# Patient Record
Sex: FEMALE | Race: WHITE | NOT HISPANIC OR LATINO
[De-identification: names, ages, dates, MRNs, and addresses within clinical notes are randomized per-mention and may not be internally consistent; named-entity substitution may affect disease eponyms.]

---

## 2021-07-09 ENCOUNTER — RX ONLY (RX ONLY)
Age: 71
End: 2021-07-09

## 2021-07-09 RX ORDER — CLOBETASOL PROPIONATE 0.5 MG/G
1 AS DIRECTED OINTMENT TOPICAL TWICE A DAY
Qty: 45 | Refills: 2
Start: 2021-07-09

## 2022-02-23 ENCOUNTER — *OTHER (OUTPATIENT)
Dept: URBAN - METROPOLITAN AREA CLINIC 24 | Facility: CLINIC | Age: 72
Setting detail: DERMATOLOGY
End: 2022-02-23

## 2022-02-23 ENCOUNTER — RX ONLY (RX ONLY)
Age: 72
End: 2022-02-23

## 2022-02-23 PROCEDURE — 99213 OFFICE O/P EST LOW 20 MIN: CPT

## 2022-02-23 RX ORDER — BETAMETHASONE DIPROPIONATE 0.5 MG/G
1 LIBERALLY OINTMENT TOPICAL TWICE A DAY
Qty: 45 | Refills: 4
Start: 2022-02-23

## 2022-02-23 RX ORDER — OXICONAZOLE NITRATE 10 MG/G
1 LIBERALLY CREAM TOPICAL TWICE A DAY
Qty: 90 | Refills: 4
Start: 2022-02-23

## 2022-02-25 ENCOUNTER — RX ONLY (RX ONLY)
Age: 72
End: 2022-02-25

## 2022-02-25 RX ORDER — HALOBETASOL PROPIONATE 0.5 MG/G
1 AS DIRECTED OINTMENT TOPICAL TWICE A DAY
Qty: 50 | Refills: 4
Start: 2022-02-25

## 2022-03-02 ENCOUNTER — RX ONLY (RX ONLY)
Age: 72
End: 2022-03-02

## 2022-03-02 RX ORDER — HALOBETASOL PROPIONATE 0.5 MG/G
1 AS DIRECTED OINTMENT TOPICAL TWICE A DAY
Qty: 50 | Refills: 4
Start: 2022-03-02

## 2022-11-02 ENCOUNTER — APPOINTMENT (OUTPATIENT)
Dept: URBAN - METROPOLITAN AREA CLINIC 193 | Age: 72
Setting detail: DERMATOLOGY
End: 2022-11-03

## 2022-11-02 DIAGNOSIS — L82.1 OTHER SEBORRHEIC KERATOSIS: ICD-10-CM

## 2022-11-02 DIAGNOSIS — L81.4 OTHER MELANIN HYPERPIGMENTATION: ICD-10-CM

## 2022-11-02 DIAGNOSIS — D22 MELANOCYTIC NEVI: ICD-10-CM

## 2022-11-02 DIAGNOSIS — B35.1 TINEA UNGUIUM: ICD-10-CM

## 2022-11-02 DIAGNOSIS — B35.3 TINEA PEDIS: ICD-10-CM

## 2022-11-02 DIAGNOSIS — L57.8 OTHER SKIN CHANGES DUE TO CHRONIC EXPOSURE TO NONIONIZING RADIATION: ICD-10-CM

## 2022-11-02 PROBLEM — D22.61 MELANOCYTIC NEVI OF RIGHT UPPER LIMB, INCLUDING SHOULDER: Status: ACTIVE | Noted: 2022-11-02

## 2022-11-02 PROCEDURE — OTHER COUNSELING: OTHER

## 2022-11-02 PROCEDURE — OTHER PRESCRIPTION: OTHER

## 2022-11-02 PROCEDURE — 99213 OFFICE O/P EST LOW 20 MIN: CPT

## 2022-11-02 PROCEDURE — OTHER PRESCRIPTION MEDICATION MANAGEMENT: OTHER

## 2022-11-02 RX ORDER — SULCONAZOLE NITRATE 10 MG/G
CREAM TOPICAL
Qty: 60 | Refills: 3 | Status: ERX | COMMUNITY
Start: 2022-11-02

## 2022-11-02 ASSESSMENT — LOCATION SIMPLE DESCRIPTION DERM
LOCATION SIMPLE: CHEST
LOCATION SIMPLE: LEFT GREAT TOE
LOCATION SIMPLE: LEFT PLANTAR SURFACE
LOCATION SIMPLE: RIGHT UPPER ARM
LOCATION SIMPLE: LEFT PRETIBIAL REGION
LOCATION SIMPLE: LEFT ANTERIOR NECK
LOCATION SIMPLE: RIGHT PLANTAR SURFACE
LOCATION SIMPLE: RIGHT GREAT TOE

## 2022-11-02 ASSESSMENT — LOCATION DETAILED DESCRIPTION DERM
LOCATION DETAILED: LEFT GREAT TOENAIL
LOCATION DETAILED: RIGHT GREAT TOENAIL
LOCATION DETAILED: RIGHT MEDIAL PLANTAR MIDFOOT
LOCATION DETAILED: LEFT INFERIOR ANTERIOR NECK
LOCATION DETAILED: RIGHT ANTERIOR PROXIMAL UPPER ARM
LOCATION DETAILED: LEFT MEDIAL SUPERIOR CHEST
LOCATION DETAILED: LEFT PROXIMAL PRETIBIAL REGION
LOCATION DETAILED: LEFT MEDIAL PLANTAR MIDFOOT

## 2022-11-02 ASSESSMENT — LOCATION ZONE DERM
LOCATION ZONE: FEET
LOCATION ZONE: LEG
LOCATION ZONE: TRUNK
LOCATION ZONE: NECK
LOCATION ZONE: ARM
LOCATION ZONE: TOENAIL

## 2022-11-02 NOTE — HPI: EVALUATION OF SKIN LESION(S)
Hpi Title: Evaluation of Skin Lesions
Additional History: Patient also reports a lesion on her left ear. She also reports itching to left foot.

## 2022-11-02 NOTE — PROCEDURE: PRESCRIPTION MEDICATION MANAGEMENT
Detail Level: Zone
Plan: Exelderm cream sent to [facility]. To be applied twice a day to feet.
Render In Strict Bullet Format?: No
Continue Regimen: C/W Oxistat
Plan: referred patient to see a podiatrist for further treatment options.

## 2022-11-07 ENCOUNTER — RX ONLY (RX ONLY)
Age: 72
End: 2022-11-07

## 2022-11-07 RX ORDER — CICLOPIROX OLAMINE 7.7 MG/G
CREAM TOPICAL
Qty: 90 | Refills: 3 | Status: ERX | COMMUNITY
Start: 2022-11-07

## 2023-01-05 ENCOUNTER — RX ONLY (RX ONLY)
Age: 73
End: 2023-01-05

## 2023-01-05 RX ORDER — CICLOPIROX OLAMINE 7.7 MG/G
0.77 CREAM TOPICAL BID
Qty: 90 | Refills: 3 | Status: ERX | COMMUNITY
Start: 2023-01-05

## 2024-04-16 ENCOUNTER — APPOINTMENT (OUTPATIENT)
Dept: URBAN - METROPOLITAN AREA CLINIC 193 | Age: 74
Setting detail: DERMATOLOGY
End: 2024-04-16

## 2024-04-16 DIAGNOSIS — B35.3 TINEA PEDIS: ICD-10-CM

## 2024-04-16 DIAGNOSIS — B35.1 TINEA UNGUIUM: ICD-10-CM

## 2024-04-16 PROCEDURE — OTHER PRESCRIPTION MEDICATION MANAGEMENT: OTHER

## 2024-04-16 PROCEDURE — 99213 OFFICE O/P EST LOW 20 MIN: CPT

## 2024-04-16 PROCEDURE — OTHER PRESCRIPTION: OTHER

## 2024-04-16 PROCEDURE — OTHER COUNSELING: OTHER

## 2024-04-16 RX ORDER — CICLOPIROX OLAMINE 7.7 MG/G
CREAM TOPICAL BID
Qty: 90 | Refills: 5 | Status: ERX

## 2024-04-16 RX ORDER — CICLOPIROX 80 MG/ML
SOLUTION TOPICAL QHS
Qty: 6.6 | Refills: 4 | Status: ERX | COMMUNITY
Start: 2024-04-16

## 2024-04-16 ASSESSMENT — LOCATION SIMPLE DESCRIPTION DERM
LOCATION SIMPLE: LEFT 3RD TOE
LOCATION SIMPLE: LEFT 5TH TOE
LOCATION SIMPLE: RIGHT PLANTAR SURFACE
LOCATION SIMPLE: LEFT 4TH TOE
LOCATION SIMPLE: LEFT 2ND TOE
LOCATION SIMPLE: RIGHT GREAT TOE
LOCATION SIMPLE: LEFT GREAT TOE
LOCATION SIMPLE: LEFT PLANTAR SURFACE

## 2024-04-16 ASSESSMENT — LOCATION DETAILED DESCRIPTION DERM
LOCATION DETAILED: RIGHT MEDIAL PLANTAR MIDFOOT
LOCATION DETAILED: LEFT MEDIAL PLANTAR MIDFOOT
LOCATION DETAILED: LEFT 5TH TOENAIL
LOCATION DETAILED: LEFT 3RD TOENAIL
LOCATION DETAILED: LEFT 4TH TOENAIL
LOCATION DETAILED: LEFT GREAT TOENAIL
LOCATION DETAILED: RIGHT GREAT TOENAIL
LOCATION DETAILED: LEFT 2ND TOENAIL

## 2024-04-16 ASSESSMENT — LOCATION ZONE DERM
LOCATION ZONE: FEET
LOCATION ZONE: TOENAIL

## 2024-04-16 NOTE — PROCEDURE: PRESCRIPTION MEDICATION MANAGEMENT
Render In Strict Bullet Format?: No
Initiate Treatment: Ciclopirox 8% topical solution QHS to affected nails
Detail Level: Simple
Initiate Treatment: Ciclopirox cream BID

## 2024-05-01 RX ORDER — CICLOPIROX 80 MG/ML
SOLUTION TOPICAL QHS
Qty: 6.6 | Refills: 4 | Status: ERX

## 2024-06-11 ENCOUNTER — APPOINTMENT (OUTPATIENT)
Dept: URBAN - METROPOLITAN AREA CLINIC 193 | Age: 74
Setting detail: DERMATOLOGY
End: 2024-06-11

## 2024-06-11 DIAGNOSIS — B35.3 TINEA PEDIS: ICD-10-CM

## 2024-06-11 PROCEDURE — 99213 OFFICE O/P EST LOW 20 MIN: CPT

## 2024-06-11 PROCEDURE — OTHER PRESCRIPTION: OTHER

## 2024-06-11 PROCEDURE — OTHER PRESCRIPTION MEDICATION MANAGEMENT: OTHER

## 2024-06-11 PROCEDURE — OTHER COUNSELING: OTHER

## 2024-06-11 RX ORDER — CICLOPIROX OLAMINE 7.7 MG/G
0.77% CREAM TOPICAL BID
Qty: 90 | Refills: 6 | Status: ERX

## 2024-06-11 ASSESSMENT — LOCATION DETAILED DESCRIPTION DERM
LOCATION DETAILED: LEFT MEDIAL PLANTAR MIDFOOT
LOCATION DETAILED: RIGHT MEDIAL PLANTAR MIDFOOT

## 2024-06-11 ASSESSMENT — SEVERITY ASSESSMENT: SEVERITY: MILD

## 2024-06-11 ASSESSMENT — LOCATION SIMPLE DESCRIPTION DERM
LOCATION SIMPLE: RIGHT PLANTAR SURFACE
LOCATION SIMPLE: LEFT PLANTAR SURFACE

## 2024-06-11 ASSESSMENT — LOCATION ZONE DERM: LOCATION ZONE: FEET

## 2024-06-11 NOTE — PROCEDURE: PRESCRIPTION MEDICATION MANAGEMENT
Render In Strict Bullet Format?: No
Continue Regimen: Ciclopirox 0.77% cream - Apply to feet twice a day until clear, then may use QD thereafter
Plan: Recommended OTC Zeasorb AF powder
Detail Level: Simple

## 2025-07-22 RX ORDER — CICLOPIROX OLAMINE 7.7 MG/G
0.77% CREAM TOPICAL BID
Qty: 90 | Refills: 3 | Status: ERX